# Patient Record
Sex: MALE | Race: OTHER
[De-identification: names, ages, dates, MRNs, and addresses within clinical notes are randomized per-mention and may not be internally consistent; named-entity substitution may affect disease eponyms.]

---

## 2019-03-05 ENCOUNTER — HOSPITAL ENCOUNTER (EMERGENCY)
Dept: HOSPITAL 54 - ER | Age: 34
LOS: 1 days | Discharge: HOME | End: 2019-03-06
Payer: SELF-PAY

## 2019-03-05 VITALS — BODY MASS INDEX: 27.74 KG/M2 | WEIGHT: 183 LBS | HEIGHT: 68 IN

## 2019-03-05 DIAGNOSIS — Z59.0: ICD-10-CM

## 2019-03-05 DIAGNOSIS — F19.10: Primary | ICD-10-CM

## 2019-03-05 LAB
ALBUMIN SERPL BCP-MCNC: 4.5 G/DL (ref 3.4–5)
ALP SERPL-CCNC: 99 U/L (ref 46–116)
ALT SERPL W P-5'-P-CCNC: 90 U/L (ref 12–78)
APAP SERPL-MCNC: < 2 UG/ML (ref 10–30)
AST SERPL W P-5'-P-CCNC: 54 U/L (ref 15–37)
BASOPHILS # BLD AUTO: 0 /CMM (ref 0–0.2)
BASOPHILS NFR BLD AUTO: 0.4 % (ref 0–2)
BILIRUB DIRECT SERPL-MCNC: 0.3 MG/DL (ref 0–0.2)
BILIRUB SERPL-MCNC: 1.5 MG/DL (ref 0.2–1)
BILIRUB UR QL STRIP: (no result)
BUN SERPL-MCNC: 25 MG/DL (ref 7–18)
CALCIUM SERPL-MCNC: 10.1 MG/DL (ref 8.5–10.1)
CHLORIDE SERPL-SCNC: 101 MMOL/L (ref 98–107)
CO2 SERPL-SCNC: 21 MMOL/L (ref 21–32)
COLOR UR: (no result)
CREAT SERPL-MCNC: 1 MG/DL (ref 0.6–1.3)
EOSINOPHIL NFR BLD AUTO: 0.2 % (ref 0–6)
ETHANOL SERPL-MCNC: < 3 MG/DL (ref 0–0)
GLUCOSE SERPL-MCNC: 116 MG/DL (ref 74–106)
HCT VFR BLD AUTO: 47 % (ref 39–51)
HGB BLD-MCNC: 16.8 G/DL (ref 13.5–17.5)
KETONES UR STRIP-MCNC: >=160 MG/DL
LYMPHOCYTES NFR BLD AUTO: 2.3 /CMM (ref 0.8–4.8)
LYMPHOCYTES NFR BLD AUTO: 21.4 % (ref 20–44)
MCHC RBC AUTO-ENTMCNC: 35 G/DL (ref 31–36)
MCV RBC AUTO: 89 FL (ref 80–96)
MONOCYTES NFR BLD AUTO: 1 /CMM (ref 0.1–1.3)
MONOCYTES NFR BLD AUTO: 9.3 % (ref 2–12)
NEUTROPHILS # BLD AUTO: 7.3 /CMM (ref 1.8–8.9)
NEUTROPHILS NFR BLD AUTO: 68.7 % (ref 43–81)
PH UR STRIP: 5.5 [PH] (ref 5–8)
PLATELET # BLD AUTO: 248 /CMM (ref 150–450)
POTASSIUM SERPL-SCNC: 3.2 MMOL/L (ref 3.5–5.1)
PROT SERPL-MCNC: 8 G/DL (ref 6.4–8.2)
PROT UR QL STRIP: 100 MG/DL
RBC # BLD AUTO: 5.34 MIL/UL (ref 4.5–6)
RBC #/AREA URNS HPF: (no result) /HPF (ref 0–2)
SALICYLATES SERPL-MCNC: < 2.8 MG/DL (ref 2.8–20)
SODIUM SERPL-SCNC: 138 MMOL/L (ref 136–145)
UROBILINOGEN UR STRIP-MCNC: 0.2 EU/DL
WBC #/AREA URNS HPF: (no result) /HPF (ref 0–3)
WBC NRBC COR # BLD AUTO: 10.5 K/UL (ref 4.3–11)

## 2019-03-05 PROCEDURE — 96372 THER/PROPH/DIAG INJ SC/IM: CPT

## 2019-03-05 PROCEDURE — 80305 DRUG TEST PRSMV DIR OPT OBS: CPT

## 2019-03-05 PROCEDURE — 99283 EMERGENCY DEPT VISIT LOW MDM: CPT

## 2019-03-05 PROCEDURE — 80048 BASIC METABOLIC PNL TOTAL CA: CPT

## 2019-03-05 PROCEDURE — 80076 HEPATIC FUNCTION PANEL: CPT

## 2019-03-05 PROCEDURE — 80307 DRUG TEST PRSMV CHEM ANLYZR: CPT

## 2019-03-05 PROCEDURE — G0480 DRUG TEST DEF 1-7 CLASSES: HCPCS

## 2019-03-05 PROCEDURE — 80329 ANALGESICS NON-OPIOID 1 OR 2: CPT

## 2019-03-05 PROCEDURE — 81001 URINALYSIS AUTO W/SCOPE: CPT

## 2019-03-05 PROCEDURE — 36415 COLL VENOUS BLD VENIPUNCTURE: CPT

## 2019-03-05 PROCEDURE — 85025 COMPLETE CBC W/AUTO DIFF WBC: CPT

## 2019-03-05 SDOH — ECONOMIC STABILITY - HOUSING INSECURITY: HOMELESSNESS: Z59.0

## 2019-03-05 NOTE — NUR
PT RESTING COMFORTABLY IN BED, SNORING.  NO COMPLAINTS AT THIS TIME.  EASILY 
AROUSED, VSS.  WILL CONT TO MONITOR.

## 2019-03-05 NOTE — NUR
BIBA RA 39 "Homeless was laying near tent friend called. Yi speaking 
Hyperventilating BS- 102. "  PT NOT PROVIDING ANY INFORMATION OR ANSWERING 
QUESTIONS.  NO ACUTE DISTRESS NOTED, SKIN INTACT.  READY FOR EVAL.

## 2019-03-05 NOTE — NUR
-------------------------------------------------------------------------------

           *** Note apolonia in EDM - 03/05/19 at 1635 by DAHIANA ***            

-------------------------------------------------------------------------------

ATTEMPTED TO COLLECT URINE.  PT ONLY RESPONDING TO PAINFUL STIMULI, THEN 
FALLING BACK ASLEEP.  VSS.  WILL CONT TO MONITOR.

## 2019-03-05 NOTE — NUR
ATTEMPTED TO COLLECT URINE.  PT UNABLE TO PROVIDE.  DR HANNON SPOKE WITH PT 
AND LEARNED PT IS MUTE.  WILL HOLD URINE COLLECTION AND CONTINUE WITH ER 
OBSERVATION, PER MD

## 2019-03-05 NOTE — NUR
-------------------------------------------------------------------------------

           *** Note undone in ED - 03/05/19 at 2306 by DAHIANA ***            

-------------------------------------------------------------------------------

PT BACK FROM CT, HOOKED TO MONITOR.  WILL CONT TO MONITOR

## 2019-03-05 NOTE — NUR
ATTEMPTED TO COLLECT URINE.  PT ONLY RESPONDING TO PAINFUL STIMULI, THEN 
FALLING BACK ASLEEP.  VSS.  WILL CONT TO MONITOR.

## 2019-03-05 NOTE — NUR
MEDICATION ADMINISTERED IM.  PT KORI JENKINS.  PROVIDED SIPS OF WATER.

-------------------------------------------------------------------------------

Addendum: 03/05/19 at 1729 by CJUWONO

-------------------------------------------------------------------------------

CORRECTION: PT NOT MUTE.  RAMBLING IN Turkish

## 2019-03-06 VITALS — DIASTOLIC BLOOD PRESSURE: 71 MMHG | SYSTOLIC BLOOD PRESSURE: 124 MMHG

## 2019-03-06 NOTE — NUR
PT REQUESTING TO BE DISCHARGED. PT OK TO BE DISCHARGED PER DR RAMIREZ. Patient 
is awake and alert to self, day, and place. PT ambulatory with a steady gait

Patient discharged to home in stable condition. Written and verbal after care 
instructions given. Patient verbalizes understanding of instruction.